# Patient Record
Sex: FEMALE | Race: WHITE | NOT HISPANIC OR LATINO
[De-identification: names, ages, dates, MRNs, and addresses within clinical notes are randomized per-mention and may not be internally consistent; named-entity substitution may affect disease eponyms.]

---

## 2022-04-26 PROBLEM — Z00.00 ENCOUNTER FOR PREVENTIVE HEALTH EXAMINATION: Status: ACTIVE | Noted: 2022-04-26

## 2022-04-27 DIAGNOSIS — M43.16 SPONDYLOLISTHESIS, LUMBAR REGION: ICD-10-CM

## 2022-04-27 DIAGNOSIS — M54.16 RADICULOPATHY, LUMBAR REGION: ICD-10-CM

## 2022-04-27 DIAGNOSIS — M47.817 SPONDYLOSIS W/OUT MYELOPATHY OR RADICULOPATHY, LUMBOSACRAL REGION: ICD-10-CM

## 2022-04-27 DIAGNOSIS — M48.061 SPINAL STENOSIS, LUMBAR REGION WITHOUT NEUROGENIC CLAUDICATION: ICD-10-CM

## 2022-04-27 DIAGNOSIS — M51.27 OTHER INTERVERTEBRAL DISC DISPLACEMENT, LUMBOSACRAL REGION: ICD-10-CM

## 2022-04-28 ENCOUNTER — APPOINTMENT (OUTPATIENT)
Dept: ORTHOPEDIC SURGERY | Facility: CLINIC | Age: 67
End: 2022-04-28

## 2022-07-30 ENCOUNTER — EMERGENCY (EMERGENCY)
Facility: HOSPITAL | Age: 67
LOS: 1 days | Discharge: HOME | End: 2022-07-30
Admitting: EMERGENCY MEDICINE

## 2022-07-30 DIAGNOSIS — E86.0 DEHYDRATION: ICD-10-CM

## 2022-07-30 DIAGNOSIS — R55 SYNCOPE AND COLLAPSE: ICD-10-CM

## 2022-07-30 DIAGNOSIS — I10 ESSENTIAL (PRIMARY) HYPERTENSION: ICD-10-CM

## 2022-07-30 PROCEDURE — 70450 CT HEAD/BRAIN W/O DYE: CPT | Mod: 26,MA

## 2022-07-30 PROCEDURE — 71045 X-RAY EXAM CHEST 1 VIEW: CPT | Mod: 26

## 2022-07-30 PROCEDURE — 99285 EMERGENCY DEPT VISIT HI MDM: CPT

## 2022-07-30 PROCEDURE — 93010 ELECTROCARDIOGRAM REPORT: CPT

## 2022-08-12 ENCOUNTER — APPOINTMENT (OUTPATIENT)
Dept: ORTHOPEDIC SURGERY | Facility: CLINIC | Age: 67
End: 2022-08-12

## 2022-08-12 VITALS — HEIGHT: 68 IN | WEIGHT: 242 LBS | BODY MASS INDEX: 36.68 KG/M2

## 2022-08-12 DIAGNOSIS — M17.11 UNILATERAL PRIMARY OSTEOARTHRITIS, RIGHT KNEE: ICD-10-CM

## 2022-08-12 DIAGNOSIS — M17.12 UNILATERAL PRIMARY OSTEOARTHRITIS, LEFT KNEE: ICD-10-CM

## 2022-08-12 PROCEDURE — 20610 DRAIN/INJ JOINT/BURSA W/O US: CPT | Mod: LT

## 2022-08-12 PROCEDURE — 73564 X-RAY EXAM KNEE 4 OR MORE: CPT | Mod: 50

## 2022-08-12 PROCEDURE — 99214 OFFICE O/P EST MOD 30 MIN: CPT | Mod: 25

## 2022-08-12 RX ORDER — TRIAMTERENE AND HYDROCHLOROTHIAZIDE 25; 37.5 MG/1; MG/1
37.5-25 TABLET ORAL
Qty: 90 | Refills: 0 | Status: ACTIVE | COMMUNITY
Start: 2022-07-19

## 2022-08-12 RX ORDER — CHOLECALCIFEROL (VITAMIN D3) 1250 MCG
1.25 MG CAPSULE ORAL
Qty: 12 | Refills: 0 | Status: ACTIVE | COMMUNITY
Start: 2022-07-19

## 2022-08-12 RX ORDER — METOPROLOL SUCCINATE 50 MG/1
50 TABLET, EXTENDED RELEASE ORAL
Qty: 135 | Refills: 0 | Status: ACTIVE | COMMUNITY
Start: 2022-07-19

## 2022-08-12 RX ORDER — COVID-19 ANTIGEN TEST
KIT MISCELLANEOUS
Qty: 4 | Refills: 0 | Status: ACTIVE | COMMUNITY
Start: 2022-07-26

## 2022-08-12 NOTE — DISCUSSION/SUMMARY
[de-identified] :  Bilat OA let Worse \par  Going on Trip \par Lengthy discussion regarding options was had with the patient. Nonsurgical options including but not limited to cortisone,viscosupplementation, anti-inflammatory medications, activity modification,  non impact exercise /maintaining a healthy BMI, bracing, and icing were reviewed. Surgical options including but not limited to   joint replacement were discussed as was risks, benefits and alternatives. All questions were answered.\par \par CSI TODAY\par

## 2022-08-12 NOTE — REASON FOR VISIT
[FreeTextEntry2] : here today for left knee pain.  patient had gel injections in 3/2022 .  patient here d/t increased knee pain and would like a santosh injection as she is going on a trip and will be doing a lot of walking.  using advil for pain and using ice on knee.

## 2022-08-12 NOTE — PROCEDURE
[Large Joint Injection] : Large joint injection [Left] : of the left [Knee] : knee [___ cc    6mg] :  Betamethasone (Celestone) ~Vcc of 6mg [Call if redness, pain or fever occur] : call if redness, pain or fever occur [Apply ice for 15min out of every hour for the next 12-24 hours as tolerated] : apply ice for 15 minutes out of every hour for the next 12-24 hours as tolerated [Previous OTC use and PT nontherapeutic] : patient has tried OTC's including aspirin, Ibuprofen, Aleve, etc or prescription NSAIDS, and/or exercises at home and/or physical therapy without satisfactory response [Patient had decreased mobility in the joint] : patient had decreased mobility in the joint [Risks, benefits, alternatives discussed / Verbal consent obtained] : the risks benefits, and alternatives have been discussed, and verbal consent was obtained

## 2022-08-12 NOTE — PHYSICAL EXAM
[5___] : hamstring 5[unfilled]/5 [] : mildly antalgic [Left] : left knee [advanced tricompartmental OA with lateral compartment narrowing and valgus alignment] : advanced tricompartmental OA with lateral compartment narrowing and valgus alignment [Right] : right knee [AP] : anteroposterior [Lateral] : lateral [Outside films reviewed] : Outside films reviewed [advanced tricompartmental OA with medial compartment narrowing and varus alignment] : advanced tricompartmental OA with medial compartment narrowing and varus alignment [de-identified] : waddle gait [FreeTextEntry9] : joint space narrowing. Sclerotic Bone VARUS Knee bone on bone MEDIAL . tri comp Osteophyte formation. No fractures seen\par  [TWNoteComboBox7] : flexion 105 degrees [de-identified] : extension 0 degrees

## 2023-03-30 ENCOUNTER — APPOINTMENT (OUTPATIENT)
Dept: ORTHOPEDIC SURGERY | Facility: CLINIC | Age: 68
End: 2023-03-30
Payer: MEDICARE

## 2023-03-30 VITALS — BODY MASS INDEX: 38.65 KG/M2 | HEIGHT: 68 IN | WEIGHT: 255 LBS

## 2023-03-30 DIAGNOSIS — M75.121 COMPLETE ROTATOR CUFF TEAR OR RUPTURE OF RIGHT SHOULDER, NOT SPECIFIED AS TRAUMATIC: ICD-10-CM

## 2023-03-30 DIAGNOSIS — M25.511 PAIN IN RIGHT SHOULDER: ICD-10-CM

## 2023-03-30 DIAGNOSIS — M19.011 PRIMARY OSTEOARTHRITIS, RIGHT SHOULDER: ICD-10-CM

## 2023-03-30 DIAGNOSIS — Z87.39 PERSONAL HISTORY OF OTHER DISEASES OF THE MUSCULOSKELETAL SYSTEM AND CONNECTIVE TISSUE: ICD-10-CM

## 2023-03-30 DIAGNOSIS — I10 ESSENTIAL (PRIMARY) HYPERTENSION: ICD-10-CM

## 2023-03-30 DIAGNOSIS — Z78.9 OTHER SPECIFIED HEALTH STATUS: ICD-10-CM

## 2023-03-30 DIAGNOSIS — G89.29 PAIN IN RIGHT SHOULDER: ICD-10-CM

## 2023-03-30 PROCEDURE — 99214 OFFICE O/P EST MOD 30 MIN: CPT

## 2023-03-30 RX ORDER — MELOXICAM 7.5 MG/1
7.5 TABLET ORAL DAILY
Qty: 30 | Refills: 0 | Status: ACTIVE | COMMUNITY
Start: 2023-03-30 | End: 1900-01-01

## 2023-03-31 NOTE — DATA REVIEWED
[MRI] : MRI [Right] : of the right [Shoulder] : shoulder [Report was reviewed and noted in the chart] : The report was reviewed and noted in the chart [I independently reviewed and interpreted images and report] : I independently reviewed and interpreted images and report [I reviewed the films/CD and additionally noted] : I reviewed the films/CD and additionally noted [FreeTextEntry1] : complete rotator cuff tear chronic in nature, supraspinatus and infraspinatus tears with retraction, associate fat atrophy consistent with rotator cuff arthropathy, AC OA, GH OA

## 2023-03-31 NOTE — IMAGING
[de-identified] : The patient is a well appearing 68 year  old female of their stated age. \par Neck is supple & nontender to palpation. Negative Spurling's test. \par \par Effected Shoulder: RIGHT \par Inspection: \par Scapula Winging: Negative \par Deformity: None \par Erythema: None \par Ecchymosis: None \par Abrasions: None \par Effusion: None \par \par Range of Motion: \par Active Forward Flexion: 170 degrees  \par Active Abduction: 170 degrees  \par Passive Forward Flexion: 170 degrees  \par Passive Abduction: 170 degrees  \par ER @ 90 degrees: 90 degrees \par IR @ 90 degrees: 45 degrees \par ER @ 0 degrees: 50 degrees \par Motor Exam: \par Forward Flexion: 3- out of 5 \par Flexion Plane of Scapula: 3- out of 5 \par Abduction: 3- out of 5 \par Internal Rotation: 5 out of 5 \par External Rotation: 3- out of 5 \par Distal Motor Strength: 5 out of 5 \par \par Stability Testing: \par Anterior: 1+ \par Posterior: 1+ \par Sulcus N: 1+ \par Sulcus ER: 1+ \par Provocative Tests: \par Drop Arm: Negative \par Impingement: Negative \par Polk: Negative \par X-Arm Adduction: Negative \par Belly Press: Negative \par Bear Hug: Negative \par Lift Off: Negative \par Apprehension: Negative \par Relocation: Negative \par Posterior Load & Shift: Negative \par \par Palpation: \par AC Joint: Nontender \par Clavicle: Nontender \par SC Joint: Nontender \par Bicepital Groove: Nontender \par Coracoid Process: Nontender \par Pectoralis Minor Tendon: Nontender \par Pectoralis Major Tendon: Nontender & palpably intact \par Latissimus Dorsi: Nontender  \par Proximal Humerus: Nontender \par Scapula Body: Nontender \par Medial Scapula Boarder: Nontender \par Scapula Spine: Nontender \par \par Neurologic Exam: Sensation to Light Touch: \par Axillary: Grossly intact \par Ulnar: Grossly intact \par Radial: Grossly intact \par Median: Grossly intact \par Other:  N/A \par Circulatory/Pulses: \par Ulnar: 2+ \par Radial: 2+ \par Other Pertinent Findings: secondary trapezial/paraspinal muscle spasms and tenderness\par \par Contralateral Shoulder \par Range of Motion: \par Active Forward Flexion: 180 degrees  \par Active Abduction: 180 degrees  \par Passive Forward Flexion: 180 degrees  \par Passive Abduction: 180 degrees  \par ER @ 90 degrees: 90 degrees \par IR @ 90 degrees: 45 degrees \par ER @ 0 degrees: 50 degrees \par Motor Exam: \par Forward Flexion: 5 out of 5 \par Flexion Plane of Scapula: 5 out of 5 \par Abduction: 5 out of 5 \par Internal Rotation: 5 out of 5 \par External Rotation: 5 out of 5 \par Distal Motor Strength: 5 out of 5 \par Stability Testing: \par Anterior: 1+ \par Posterior: 1+ \par Sulcus N: 1+ \par Sulcus ER: 1+ \par Other Pertinent Findings: None \par \par Assessment: The patient is a 68 year old female with right shoulder pain and radiographic and physical exam findings consistent with chronic rotator cuff tear, AC OA, and GH OA.   \par The patient’s condition is acute \par Documents/Results Reviewed Today: MRI right shoulder\par Tests/Studies Independently Interpreted Today: MRI right shoulder reveals complete rotator cuff tear chronic in nature, supraspinatus, subscapularis, and infraspinatus tears with retraction with associated fat atrophy consistent with rotator cuff arthropathy, AC OA, GH OA\par Pertinent findings include: 170/170/90/45/50, 3-/5 ABD/FF/FSP/ER, 5/5 IR, secondary trapezial/paraspinal muscle spasms and tenderness\par Confounding medical conditions/concerns: None\par \par Plan: Discussed non-operative and operative treatment options. Advised patient that she is not a candidate for a rotator cuff repair, however, she is a candidate for reverse shoulder arthroplasty. Discussed conservative treatments in the form of injections. Patient declines injections at this time. Patient will start physical therapy, HEP, and stretching. Prescribed Meloxicam for pain management - use as directed. Modify activity as discussed. \par Tests Ordered: None \par Prescription Medications Ordered: Meloxicam \par Braces/DME Ordered: None \par Activity/Work/Sports Status: None \par Additional Instructions: None\par Follow-Up: 6 weeks \par \par The patient's current medication management of their orthopedic diagnosis was reviewed today:\par (1) We discussed a comprehensive treatment plan that included possible pharmaceutical management involving the use of prescription strength medications including but not limited to options such as oral Naprosyn 500mg BID, once daily Meloxicam 15 mg, or 500-650 mg Tylenol versus over the counter oral medications and topical prescription NSAID Pennsaid vs over the counter Voltaren gel.  Based on our extensive discussion, the patient was prescribed Meloxicam 15mg daily for two weeks.  It will then be used PRN for pain, inflammation and discomfort.\par (2) There is a moderate risk of morbidity with further treatment, especially from use of prescription strength medications and possible side effects of these medications which include upset stomach with oral medications, skin reactions to topical medications and cardiac/renal issues with long term use.\par (3) I recommended that the patient follow-up with their medical physician to discuss any significant specific potential issues with long term medication use such as interactions with current medications or with exacerbation of underlying medical comorbidities.\par (4) The benefits and risks associated with use of injectable, oral or topical, prescription and over the counter anti-inflammatory medications were discussed with the patient. The patient voiced understanding of the risks including but not limited to bleeding, stroke, kidney dysfunction, heart disease, and were referred to the black box warning label for further information.\par \par I, Mahogany Pickett, attest that this documentation has been prepared under the direction and in the presence of Provider Dr. Kyrie Webber.\par \par The documentation recorded by the scribe accurately reflects the services I, Dr. Kyrie Webber, personally performed and the decisions made by me.\par

## 2023-03-31 NOTE — HISTORY OF PRESENT ILLNESS
[de-identified] : The patient is a 68 year  old right hand dominant female who presents today complaining of right shoulder pain.  \par Date of Injury/Onset: 2/15/23\par Pain:    At Rest: 4/10 \par With Activity:  4/10 \par Mechanism of injury: no specific TACO. Pt began to feel pain that has began to radiate into her neck and pain has not resolved.\par This is not a Work Related Injury being treated under Worker's Compensation.\par This is not an athletic injury occurring associated with an interscholastic or organized sports team.\par Quality of symptoms: aching pain at rest, sharp pain with activity, weakness\par Improves with: rest, NSAID's\par Worse with: sitting at rest, lifting\par Prior treatment: Primary care MD, NSAIDs\par Prior Imaging: xrays, MRI\par Out of work/sport: not working\par School/Sport/Position/Occupation: not working\par Additional Information: None\par

## 2025-02-18 ENCOUNTER — APPOINTMENT (OUTPATIENT)
Dept: ORTHOPEDIC SURGERY | Facility: CLINIC | Age: 70
End: 2025-02-18
Payer: MEDICARE

## 2025-02-18 VITALS — HEIGHT: 68 IN | WEIGHT: 255 LBS | BODY MASS INDEX: 38.65 KG/M2

## 2025-02-18 DIAGNOSIS — M17.11 UNILATERAL PRIMARY OSTEOARTHRITIS, RIGHT KNEE: ICD-10-CM

## 2025-02-18 DIAGNOSIS — Z85.42 PERSONAL HISTORY OF MALIGNANT NEOPLASM OF OTHER PARTS OF UTERUS: ICD-10-CM

## 2025-02-18 DIAGNOSIS — M17.12 UNILATERAL PRIMARY OSTEOARTHRITIS, LEFT KNEE: ICD-10-CM

## 2025-02-18 PROCEDURE — 99214 OFFICE O/P EST MOD 30 MIN: CPT

## 2025-02-18 PROCEDURE — 73564 X-RAY EXAM KNEE 4 OR MORE: CPT | Mod: 50

## 2025-02-22 ENCOUNTER — NON-APPOINTMENT (OUTPATIENT)
Age: 70
End: 2025-02-22

## 2025-04-02 ENCOUNTER — APPOINTMENT (OUTPATIENT)
Dept: ORTHOPEDIC SURGERY | Facility: HOSPITAL | Age: 70
End: 2025-04-02
Payer: MEDICARE

## 2025-04-02 PROCEDURE — 20985 CPTR-ASST DIR MS PX: CPT

## 2025-04-02 PROCEDURE — 27447 TOTAL KNEE ARTHROPLASTY: CPT | Mod: RT

## 2025-04-02 PROCEDURE — 27447 TOTAL KNEE ARTHROPLASTY: CPT | Mod: AS,RT

## 2025-04-12 ENCOUNTER — NON-APPOINTMENT (OUTPATIENT)
Age: 70
End: 2025-04-12

## 2025-04-17 ENCOUNTER — APPOINTMENT (OUTPATIENT)
Dept: ORTHOPEDIC SURGERY | Facility: CLINIC | Age: 70
End: 2025-04-17
Payer: MEDICARE

## 2025-04-17 VITALS — HEIGHT: 68 IN | WEIGHT: 255 LBS | BODY MASS INDEX: 38.65 KG/M2

## 2025-04-17 DIAGNOSIS — Z96.651 PRESENCE OF RIGHT ARTIFICIAL KNEE JOINT: ICD-10-CM

## 2025-04-17 PROCEDURE — 99024 POSTOP FOLLOW-UP VISIT: CPT

## 2025-04-17 RX ORDER — MELOXICAM 15 MG/1
15 TABLET ORAL
Qty: 30 | Refills: 0 | Status: ACTIVE | COMMUNITY
Start: 2025-04-17 | End: 1900-01-01

## 2025-04-18 ENCOUNTER — APPOINTMENT (OUTPATIENT)
Dept: ORTHOPEDIC SURGERY | Facility: CLINIC | Age: 70
End: 2025-04-18

## 2025-05-14 ENCOUNTER — NON-APPOINTMENT (OUTPATIENT)
Age: 70
End: 2025-05-14

## 2025-05-15 RX ORDER — CEPHALEXIN 500 MG/1
500 CAPSULE ORAL
Qty: 22 | Refills: 0 | Status: ACTIVE | COMMUNITY
Start: 2025-05-15 | End: 1900-01-01

## 2025-05-22 ENCOUNTER — APPOINTMENT (OUTPATIENT)
Dept: ORTHOPEDIC SURGERY | Facility: CLINIC | Age: 70
End: 2025-05-22
Payer: MEDICARE

## 2025-05-22 DIAGNOSIS — Z96.651 PRESENCE OF RIGHT ARTIFICIAL KNEE JOINT: ICD-10-CM

## 2025-05-22 DIAGNOSIS — M17.11 UNILATERAL PRIMARY OSTEOARTHRITIS, RIGHT KNEE: ICD-10-CM

## 2025-05-22 PROCEDURE — 99024 POSTOP FOLLOW-UP VISIT: CPT

## 2025-05-22 PROCEDURE — 73562 X-RAY EXAM OF KNEE 3: CPT | Mod: RT

## 2025-07-01 ENCOUNTER — APPOINTMENT (OUTPATIENT)
Dept: ORTHOPEDIC SURGERY | Facility: CLINIC | Age: 70
End: 2025-07-01
Payer: MEDICARE

## 2025-07-01 PROCEDURE — 73562 X-RAY EXAM OF KNEE 3: CPT | Mod: RT

## 2025-07-01 PROCEDURE — 99024 POSTOP FOLLOW-UP VISIT: CPT
